# Patient Record
Sex: FEMALE | Race: OTHER
[De-identification: names, ages, dates, MRNs, and addresses within clinical notes are randomized per-mention and may not be internally consistent; named-entity substitution may affect disease eponyms.]

---
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
## 2018-09-11 ENCOUNTER — HOSPITAL ENCOUNTER (EMERGENCY)
Dept: HOSPITAL 61 - ER | Age: 18
Discharge: HOME | End: 2018-09-11
Payer: SELF-PAY

## 2018-09-11 VITALS — HEIGHT: 62 IN | WEIGHT: 150 LBS | BODY MASS INDEX: 27.6 KG/M2

## 2018-09-11 VITALS — DIASTOLIC BLOOD PRESSURE: 83 MMHG | SYSTOLIC BLOOD PRESSURE: 111 MMHG

## 2018-09-11 DIAGNOSIS — J45.909: ICD-10-CM

## 2018-09-11 DIAGNOSIS — R11.2: ICD-10-CM

## 2018-09-11 DIAGNOSIS — R56.9: Primary | ICD-10-CM

## 2018-09-11 LAB
ALBUMIN SERPL-MCNC: 4.4 G/DL (ref 3.4–5)
ALBUMIN/GLOB SERPL: 1.3 {RATIO} (ref 1–1.7)
ALP SERPL-CCNC: 47 U/L (ref 46–116)
ALT SERPL-CCNC: 24 U/L (ref 14–59)
ANION GAP SERPL CALC-SCNC: 11 MMOL/L (ref 6–14)
APTT PPP: YELLOW S
AST SERPL-CCNC: 17 U/L (ref 15–37)
BACTERIA #/AREA URNS HPF: (no result) /HPF
BASOPHILS # BLD AUTO: 0 X10^3/UL (ref 0–0.2)
BASOPHILS NFR BLD: 0 % (ref 0–3)
BILIRUB SERPL-MCNC: 0.5 MG/DL (ref 0.2–1)
BILIRUB UR QL STRIP: NEGATIVE
BUN SERPL-MCNC: 8 MG/DL (ref 7–20)
BUN/CREAT SERPL: 10 (ref 6–20)
CALCIUM SERPL-MCNC: 9.5 MG/DL (ref 8.5–10.1)
CHLORIDE SERPL-SCNC: 103 MMOL/L (ref 98–107)
CO2 SERPL-SCNC: 25 MMOL/L (ref 21–32)
CREAT SERPL-MCNC: 0.8 MG/DL (ref 0.6–1)
EOSINOPHIL NFR BLD: 0.1 X10^3/UL (ref 0–0.7)
EOSINOPHIL NFR BLD: 1 % (ref 0–3)
ERYTHROCYTE [DISTWIDTH] IN BLOOD BY AUTOMATED COUNT: 12.1 % (ref 11.5–14.5)
FIBRINOGEN PPP-MCNC: CLEAR MG/DL
GFR SERPLBLD BASED ON 1.73 SQ M-ARVRAT: 92.4 ML/MIN
GLOBULIN SER-MCNC: 3.5 G/DL (ref 2.2–3.8)
GLUCOSE SERPL-MCNC: 124 MG/DL (ref 70–99)
HCT VFR BLD CALC: 41.5 % (ref 36–47)
HGB BLD-MCNC: 14.7 G/DL (ref 12–15.5)
HYALINE CASTS #/AREA URNS LPF: (no result) /HPF
LYMPHOCYTES # BLD: 1.5 X10^3/UL (ref 1–4.8)
LYMPHOCYTES NFR BLD AUTO: 13 % (ref 24–48)
MCH RBC QN AUTO: 33 PG (ref 25–35)
MCHC RBC AUTO-ENTMCNC: 35 G/DL (ref 31–37)
MCV RBC AUTO: 92 FL (ref 79–100)
MONO #: 0.8 X10^3/UL (ref 0–1.1)
MONOCYTES NFR BLD: 7 % (ref 0–9)
NEUT #: 8.5 X10^3UL (ref 1.8–7.7)
NEUTROPHILS NFR BLD AUTO: 78 % (ref 31–73)
NITRITE UR QL STRIP: NEGATIVE
PH UR STRIP: 6 [PH]
PLATELET # BLD AUTO: 253 X10^3/UL (ref 140–400)
POTASSIUM SERPL-SCNC: 3.6 MMOL/L (ref 3.5–5.1)
PROT SERPL-MCNC: 7.9 G/DL (ref 6.4–8.2)
PROT UR STRIP-MCNC: 30 MG/DL
RBC # BLD AUTO: 4.51 X10^6/UL (ref 3.5–5.4)
RBC #/AREA URNS HPF: (no result) /HPF (ref 0–2)
SODIUM SERPL-SCNC: 139 MMOL/L (ref 136–145)
SQUAMOUS #/AREA URNS LPF: (no result) /LPF
UROBILINOGEN UR-MCNC: 0.2 MG/DL
WBC # BLD AUTO: 11 X10^3/UL (ref 4–11)
WBC #/AREA URNS HPF: (no result) /HPF (ref 0–4)
YEAST #/AREA URNS HPF: PRESENT /HPF

## 2018-09-11 PROCEDURE — 81025 URINE PREGNANCY TEST: CPT

## 2018-09-11 PROCEDURE — 81001 URINALYSIS AUTO W/SCOPE: CPT

## 2018-09-11 PROCEDURE — 70450 CT HEAD/BRAIN W/O DYE: CPT

## 2018-09-11 PROCEDURE — 87086 URINE CULTURE/COLONY COUNT: CPT

## 2018-09-11 PROCEDURE — 99285 EMERGENCY DEPT VISIT HI MDM: CPT

## 2018-09-11 PROCEDURE — 85025 COMPLETE CBC W/AUTO DIFF WBC: CPT

## 2018-09-11 PROCEDURE — 36415 COLL VENOUS BLD VENIPUNCTURE: CPT

## 2018-09-11 PROCEDURE — 80053 COMPREHEN METABOLIC PANEL: CPT

## 2018-09-11 PROCEDURE — 96374 THER/PROPH/DIAG INJ IV PUSH: CPT

## 2018-09-11 PROCEDURE — 93005 ELECTROCARDIOGRAM TRACING: CPT

## 2018-09-11 NOTE — RAD
INDICATION: FIRST SEIZURE, BLACKED OUT.<BR>NO PRIORS

 

COMPARISON: None.

 

TECHNIQUE: 

 

Axial CT images obtained through the head without intravenous contrast.

 

One or more of the following individualized dose reduction techniques were

utilized for this examination:  1. Automated exposure control;  2. 

Adjustment of the mA and/or kV according to patient size;  3. Use of 

iterative reconstruction technique.

 

FINDINGS:

 

No intracranial hemorrhage.

No midline shift.  Basal cisterns patent.

Ventricles and sulci are unremarkable.

No acute osseous abnormality.

Orbits and paranasal sinuses unremarkable.

 

IMPRESSION:

 

1.   No acute intracranial hemorrhage.

 

Electronically signed by: Jovany Byrne MD (9/11/2018 4:13 AM) [facility]

## 2018-09-11 NOTE — PHYS DOC
Past Medical History


Past Medical History:  Asthma, Other


Additional Past Medical Histor:  Genital herpes


Past Surgical History:  No Surgical History


Alcohol Use:  Occasionally


Drug Use:  Marijuana





Adult General


Chief Complaint


Chief Complaint:  SEIZURE





HPI


HPI





Patient is a 17  year old female who presents with nausea and vomiting after 

seizure like activity.  She was at home, seated in the room with her roommate. 

She doesn't fell to the floor, had generalized tonic-clonic seizure-like 

activity for 2 minutes with loss of bladder control and tongue biting. She then 

became postictal and had nausea and vomiting. 911 was called and EMS gave her 

Zofran. She vomited after Zofran as well. She presents to the emergency 

department and on my exam states that she feels much better but she does not 

recall the events of today. She denies any previous seizures. She denies any 

Ultram use, antidepressants, new medications.





Review of Systems


Review of Systems





Constitutional: Denies fever or chills, does admit to recent decreased appetite


Eyes: Denies change in visual acuity, redness, or eye pain 


HENT: Denies nasal congestion or sore throat 


Respiratory: Denies cough or shortness of breath 


Cardiovascular: No additional information not addressed in HPI 


GI: Denies abdominal pain, nausea, vomiting, bloody stools or diarrhea 


: Denies dysuria or hematuria 


Musculoskeletal: Denies back pain or joint pain 


Integument: Denies rash or skin lesions 


Neurologic: Denies headache, focal weakness or sensory changes 


Endocrine: Denies polyuria or polydipsia 





All other systems were reviewed and found to be within normal limits, except as 

documented in this note.





Current Medications


Current Medications


none





Allergies


Allergies





Allergies








Coded Allergies Type Severity Reaction Last Updated Verified


 


  No Known Drug Allergies    16 No











Physical Exam


Physical Exam


GENERAL: Awake, alert, well appearing, nontoxic


HEAD/NECK/EYES: Normocephalic, Neck supple, PERRL 


ENT Airway patent, mucous membranes moist , tiny macerated tissue spots in the 

mouth consistent with tongue trauma from seizure


RESP: Nontachypneic, no respiratory distress, normal breath sounds bilaterally 


CV: Regular rhythm, normal perfusion 


ABD/GI: Soft, non-tender, no guarding, no rebound, no palpable pulsatile mass 


BACK: Inspection NL 


EXT: Neurovascularly intact, no deformities 


SKIN: Warm, dry 


NEURO: Oriented X3, normal speech, no motor deficits, no sensory deficits, CN 

II - XII intact 


PSYCH: Cooperative, appropriate affect





Current Patient Data


Vital Signs





 Vital Signs








  Date Time  Temp Pulse Resp B/P (MAP) Pulse Ox O2 Delivery O2 Flow Rate FiO2


 


18 01:35 98.9 107 20 119/64 (82) 98 Room Air  





 98.9       








Lab Values





 Laboratory Tests








Test


 18


02:45 18


03:00 18


03:02


 


White Blood Count


 11.0 x10^3/uL


(4.0-11.0) 


 





 


Red Blood Count


 4.51 x10^6/uL


(3.50-5.40) 


 





 


Hemoglobin


 14.7 g/dL


(12.0-15.5) 


 





 


Hematocrit


 41.5 %


(36.0-47.0) 


 





 


Mean Corpuscular Volume


 92 fL ()


 


 





 


Mean Corpuscular Hemoglobin 33 pg (25-35)    


 


Mean Corpuscular Hemoglobin


Concent 35 g/dL


(31-37) 


 





 


Red Cell Distribution Width


 12.1 %


(11.5-14.5) 


 





 


Platelet Count


 253 x10^3/uL


(140-400) 


 





 


Neutrophils (%) (Auto) 78 % (31-73)  H  


 


Lymphocytes (%) (Auto) 13 % (24-48)  L  


 


Monocytes (%) (Auto) 7 % (0-9)    


 


Eosinophils (%) (Auto) 1 % (0-3)    


 


Basophils (%) (Auto) 0 % (0-3)    


 


Neutrophils # (Auto)


 8.5 x10^3uL


(1.8-7.7)  H 


 





 


Lymphocytes # (Auto)


 1.5 x10^3/uL


(1.0-4.8) 


 





 


Monocytes # (Auto)


 0.8 x10^3/uL


(0.0-1.1) 


 





 


Eosinophils # (Auto)


 0.1 x10^3/uL


(0.0-0.7) 


 





 


Basophils # (Auto)


 0.0 x10^3/uL


(0.0-0.2) 


 





 


Sodium Level


 139 mmol/L


(136-145) 


 





 


Potassium Level


 3.6 mmol/L


(3.5-5.1) 


 





 


Chloride Level


 103 mmol/L


() 


 





 


Carbon Dioxide Level


 25 mmol/L


(21-32) 


 





 


Anion Gap 11 (6-14)    


 


Blood Urea Nitrogen


 8 mg/dL (7-20)


 


 





 


Creatinine


 0.8 mg/dL


(0.6-1.0) 


 





 


Estimated GFR


(Cockcroft-Gault) 92.4  


 


 





 


BUN/Creatinine Ratio 10 (6-20)    


 


Glucose Level


 124 mg/dL


(70-99)  H 


 





 


Calcium Level


 9.5 mg/dL


(8.5-10.1) 


 





 


Total Bilirubin


 0.5 mg/dL


(0.2-1.0) 


 





 


Aspartate Amino Transferase


(AST) 17 U/L (15-37)


 


 





 


Alanine Aminotransferase (ALT)


 24 U/L (14-59)


 


 





 


Alkaline Phosphatase


 47 U/L


() 


 





 


Total Protein


 7.9 g/dL


(6.4-8.2) 


 





 


Albumin


 4.4 g/dL


(3.4-5.0) 


 





 


Albumin/Globulin Ratio 1.3 (1.0-1.7)    


 


Urine Collection Type  Unknown   


 


Urine Color  Yellow   


 


Urine Clarity  Clear   


 


Urine pH  6.0   


 


Urine Specific Gravity  1.015   


 


Urine Protein


 


 30 mg/dL


(NEG-TRACE) 





 


Urine Glucose (UA)


 


 Negative mg/dL


(NEG) 





 


Urine Ketones (Stick)


 


 40 mg/dL (NEG)


 





 


Urine Blood  Trace (NEG)   


 


Urine Nitrite


 


 Negative (NEG)


 





 


Urine Bilirubin


 


 Negative (NEG)


 





 


Urine Urobilinogen Dipstick


 


 0.2 mg/dL (0.2


mg/dL) 





 


Urine Leukocyte Esterase  Small (NEG)   


 


Urine RBC


 


 Occ /HPF (0-2)


 





 


Urine WBC


 


 1-4 /HPF (0-4)


 





 


Urine Squamous Epithelial


Cells 


 Mod /LPF  


 





 


Urine Bacteria


 


 Few /HPF


(0-FEW) 





 


Urine Hyaline Casts  Few /HPF   


 


Urine Mucus  Mod /LPF   


 


Urine Yeast  Present /HPF   


 


POC Urine HCG, Qualitative


 


 


 Hcg negative


(Negative)





 Laboratory Tests


18 02:45








 Laboratory Tests


18 02:45











EKG


EKG


[]





Radiology/Procedures


Radiology/Procedures


[]


PATIENT: CHRIS ALMARAZ ACCOUNT: [id number] MRN#: [id number]


: 2000 LOCATION: ER AGE: 17


SEX: F EXAM DT: 18 ACCESSION#: [id number]


STATUS: REG ER ORD. PHYSICIAN: SAIMA MISTRY DO 


REASON: first time seizure, headache


PROCEDURE: CT HEAD WO CONTRAST





 


INDICATION: FIRST SEIZURE, BLACKED OUT.<BR>NO PRIORS


 


COMPARISON: None.


 


TECHNIQUE: 


 


Axial CT images obtained through the head without intravenous contrast.


 


One or more of the following individualized dose reduction techniques were


utilized for this examination:  1. Automated exposure control;  2. 


Adjustment of the mA and/or kV according to patient size;  3. Use of 


iterative reconstruction technique.


 


FINDINGS:


 


No intracranial hemorrhage.


No midline shift.  Basal cisterns patent.


Ventricles and sulci are unremarkable.


No acute osseous abnormality.


Orbits and paranasal sinuses unremarkable.


 


IMPRESSION:


 


1.   No acute intracranial hemorrhage.


 


Electronically signed by: Mar Yee MD (2018 4:13 AM) [facility]














DICTATED and SIGNED BY:     MAR YEE MD


DATE:     18 0357


Impressions:


Generalized Seizure, new onset





Course & Med Decision Making


Course & Med Decision Making


Pertinent Labs and Imaging studies reviewed. (See chart for details)





[]





Dragon Disclaimer


Dragon Disclaimer


This electronic medical record was generated, in whole or in part, using a 

voice recognition dictation system.





Departure


Departure


Impression:  


 Primary Impression:  


 Generalized seizure


Referrals:  


NO PCP (PCP)











SAIMA MISTRY DO Sep 11, 2018 02:16

## 2018-09-11 NOTE — EKG
[facility]

              [address] [place] [address]

Test Date:    2018               Test Time:    04:14:53

Pat Name:     CHRIS ALMARAZ            Department:   

Patient ID:   [id number]           Room:          

Gender:       F                        Technician:   RORY

:          2000               Requested By: SAIMA MISTRY

Order Number: [id number]           Reading MD:   Ron Alexandra

                                 Measurements

Intervals                              Axis          

Rate:         64                       P:            26

IA:           144                      QRS:          12

QRSD:         76                       T:            24

QT:           390                                    

QTc:          406                                    

                           Interpretive Statements

SINUS RHYTHM

NORMAL ECG

RI6.01

Compared to ECG 2016 22:01:20

No significant changes



Electronically Signed On 2018 16:31:49 CDT by Ron Alexandra

## 2018-10-09 ENCOUNTER — HOSPITAL ENCOUNTER (EMERGENCY)
Dept: HOSPITAL 61 - ER | Age: 18
Discharge: HOME | End: 2018-10-09
Payer: SELF-PAY

## 2018-10-09 VITALS — WEIGHT: 150 LBS | BODY MASS INDEX: 27.6 KG/M2 | HEIGHT: 62 IN

## 2018-10-09 VITALS — SYSTOLIC BLOOD PRESSURE: 112 MMHG | DIASTOLIC BLOOD PRESSURE: 64 MMHG

## 2018-10-09 DIAGNOSIS — J45.909: ICD-10-CM

## 2018-10-09 DIAGNOSIS — M79.672: Primary | ICD-10-CM

## 2018-10-09 PROCEDURE — 99282 EMERGENCY DEPT VISIT SF MDM: CPT

## 2018-10-09 NOTE — PHYS DOC
Past Medical History


Past Medical History:  Asthma, Other


Additional Past Medical Histor:  Genital herpes


Past Surgical History:  No Surgical History


Alcohol Use:  Occasionally


Drug Use:  Marijuana





Adult General


Chief Complaint


Chief Complaint:  FOOT INJURY PAIN





HPI


HPI





Patient is a 18  year old female who presents with to [facility] on 10/6/40 fall down 

14 stairs.  Patient states that there is a foreign body in the bottom of her 

left heel that they saw upon ultrasound only and they did not pick it out or 

give her follow-up care instructions. Patient states that she is limping cannot 

walk on the foot. Patient is demanding that we take the foreign body out or to 

transfer her somewhere else and to give her pain medications.





Review of Systems


Review of Systems





Constitutional: Denies fever or chills []


Eyes: Denies change in visual acuity, redness, or eye pain []


HENT: Denies nasal congestion or sore throat []


Respiratory: Denies cough or shortness of breath []


Cardiovascular: No additional information not addressed in HPI []


GI: Denies abdominal pain, nausea, vomiting, bloody stools or diarrhea []


: Denies dysuria or hematuria []


Musculoskeletal: Left heel pain with foreign body. Denies back pain or joint 

pain []


Integument: Denies rash or skin lesions []


Neurologic: Denies headache, focal weakness or sensory changes []


Endocrine: Denies polyuria or polydipsia []





All other systems were reviewed and found to be within normal limits, except as 

documented in this note.





Current Medications


Current Medications





Current Medications








 Medications


  (Trade)  Dose


 Ordered  [facility]  Start Time


 Stop Time Status Last Admin


Dose Admin


 


 Acetaminophen/


 Hydrocodone Bitart


  (Lortab 5/325)  1 tab  1X  ONCE  10/9/18 15:00


 10/9/18 15:01 DC 10/9/18 15:16


1 TAB











Allergies


Allergies





Allergies








Coded Allergies Type Severity Reaction Last Updated Verified


 


  No Known Drug Allergies    8/12/16 No











Physical Exam


Physical Exam





Constitutional: Well developed, well nourished, no acute distress, non-toxic 

appearance. []


HENT: Normocephalic, atraumatic, bilateral external ears normal, oropharynx 

moist, no oral exudates, nose normal. []


Eyes: PERRLA, EOMI, conjunctiva normal, no discharge. [] 


Neck: Normal range of motion, no tenderness, supple, no stridor. [] 


Cardiovascular:Heart rate regular rhythm, no murmur []


Lungs & Thorax:  Bilateral breath sounds clear to auscultation []


Abdomen: Bowel sounds normal, soft, no tenderness, no masses, no pulsatile 

masses. [] 


Skin: Warm, dry, no erythema, no rash. [] 


Back: No tenderness, no CVA tenderness. [] 


Extremities: Left calcaneus tenderness, Black speck seen through skin and is 

possible foreign body, no cyanosis, no clubbing, ROM intact, no edema. [] 


Neurologic: Alert and oriented X 3, normal motor function, normal sensory 

function, no focal deficits noted. []


Psychologic: Affect normal, judgement normal, mood normal. []





Current Patient Data


Vital Signs





 Vital Signs








  Date Time  Temp Pulse Resp B/P (MAP) Pulse Ox O2 Delivery O2 Flow Rate FiO2


 


10/9/18 14:45 98.2 82 16 112/64 (80) 97 Room Air  





 98.2       











EKG


EKG


[]





Radiology/Procedures


Radiology/Procedures


[]





Course & Med Decision Making


Course & Med Decision Making


Patient is a 18  year old female who presents with to [facility] on 10/6/40 fall down 

14 stairs.  Patient states that there is a foreign body in the bottom of her 

left heel that they saw upon ultrasound only and they did not pick it out or 

give her follow-up care instructions. Patient states that she is limping cannot 

walk on the foot. Patient is demanding that we take the foreign body out or to 

transfer her somewhere else and to give her pain medications. The patient is 

told that to get the foreign body removed, if there indeed is one, that she 

will have to follow up with podiatry. Patient is "not happy and does not 

understand why we can not just take it out and states that what if it gets 

infected, are you just going to send me out like this?" Patient rates her pain 

a 7 out of 10. Patient states she is not taking anything for pain. Her last 

menses was September 18. Alert and Oriented. Lungs are clear to auscultation. 

heart rate regular and without murmur.  Neurologically intact. Patient denies 

dizziness, nausea, vomiting, or syncopal episodes. Patient denies cervical 

spine pain and states she is only here for her foot and wants it taken care of. 

On the bottom of patient left heel there is a black speck that can be seen 

through the skin. There are no signs of infection, redness, or swelling. 

Patient is afebrile. I have given the patient one Norco and have requested the 

chart from the patient [facility] visit. [facility] medical records came back with a ultrasound 

soft tissue of the patient's affected he'll states there is a possible 1.11.3

1.3 mm retained foreign body proximal me 5-6 mm from skin surface without 

significant associated fluid collection or soft tissue changes concerning for 

infection or inflammation. They also did a tib-fib 2 view with no acute 

findings and a foot 2 views with no acute findings. Patient was given Boostrix 

and Toradol in the ED for pain. And sent home with naproxen for pain control to 

follow-up with Mary within a week and to return to the ER if pain became worse 

or there are signs of infection. Patient will be given the same follow-up 

instructions. Give the patient one Norco here in the ED. upon getting patient 

discharge instructions patient is very upset and states that "I expect you to x-

ray my foot and you guys are and bunch of wasted resources." Patient then walks 

out of the ER with steady gait and is not limping on her effected foot.





[]





Dragon Disclaimer


Dragon Disclaimer


This electronic medical record was generated, in whole or in part, using a 

voice recognition dictation system.





Departure


Departure


Impression:  


 Primary Impression:  


 Foot pain


Disposition:  01 HOME, SELF-CARE


Condition:  STABLE


Referrals:  


NO PCP (PCP)








MARIAN PARKER DPM


Patient Instructions:  Foot Contusion





Additional Instructions:  


Follow up with Podiatry or with Mary as instructed by [facility] as soon as possible. 

Mary [phone]





Problem Qualifiers








 Primary Impression:  


 Foot pain


 Laterality:  left  Qualified Codes:  M79.672 - Pain in left foot








BETH NUNEZ APRN Oct 9, 2018 15:21